# Patient Record
Sex: MALE | Race: WHITE | NOT HISPANIC OR LATINO | URBAN - METROPOLITAN AREA
[De-identification: names, ages, dates, MRNs, and addresses within clinical notes are randomized per-mention and may not be internally consistent; named-entity substitution may affect disease eponyms.]

---

## 2017-05-09 ENCOUNTER — IMPORTED ENCOUNTER (OUTPATIENT)
Dept: URBAN - METROPOLITAN AREA CLINIC 50 | Facility: CLINIC | Age: 77
End: 2017-05-09

## 2017-05-22 ENCOUNTER — IMPORTED ENCOUNTER (OUTPATIENT)
Dept: URBAN - METROPOLITAN AREA CLINIC 50 | Facility: CLINIC | Age: 77
End: 2017-05-22

## 2017-06-09 ENCOUNTER — IMPORTED ENCOUNTER (OUTPATIENT)
Dept: URBAN - METROPOLITAN AREA CLINIC 50 | Facility: CLINIC | Age: 77
End: 2017-06-09

## 2017-11-20 ENCOUNTER — IMPORTED ENCOUNTER (OUTPATIENT)
Dept: URBAN - METROPOLITAN AREA CLINIC 50 | Facility: CLINIC | Age: 77
End: 2017-11-20

## 2017-11-20 NOTE — PATIENT DISCUSSION
"""Continue Combigan right eye at bedtime. "" ""Continue Latanoprost both eyes at bedtime. "" ""Reassured patient that intraocular pressures (IOPs) are at target levels and other ocular findings are stable. Continue present management and/or medication(s).  Follow up as directed. """

## 2017-12-29 ENCOUNTER — IMPORTED ENCOUNTER (OUTPATIENT)
Dept: URBAN - METROPOLITAN AREA CLINIC 50 | Facility: CLINIC | Age: 77
End: 2017-12-29

## 2018-02-08 ENCOUNTER — IMPORTED ENCOUNTER (OUTPATIENT)
Dept: URBAN - METROPOLITAN AREA CLINIC 50 | Facility: CLINIC | Age: 78
End: 2018-02-08

## 2018-05-07 ENCOUNTER — IMPORTED ENCOUNTER (OUTPATIENT)
Dept: URBAN - METROPOLITAN AREA CLINIC 50 | Facility: CLINIC | Age: 78
End: 2018-05-07

## 2018-05-07 NOTE — PATIENT DISCUSSION
"""OCT OU Today (OD>OS)"" ""Follow dry ARMD without treatment. MVI/AREDS/Amsler. Patient to call if vision changes or distortion increases. Good diet/do not smoke. """

## 2018-11-12 ENCOUNTER — IMPORTED ENCOUNTER (OUTPATIENT)
Dept: URBAN - METROPOLITAN AREA CLINIC 50 | Facility: CLINIC | Age: 78
End: 2018-11-12

## 2019-05-20 ENCOUNTER — IMPORTED ENCOUNTER (OUTPATIENT)
Dept: URBAN - METROPOLITAN AREA CLINIC 50 | Facility: CLINIC | Age: 79
End: 2019-05-20

## 2020-05-18 ENCOUNTER — IMPORTED ENCOUNTER (OUTPATIENT)
Dept: URBAN - METROPOLITAN AREA CLINIC 50 | Facility: CLINIC | Age: 80
End: 2020-05-18

## 2020-05-18 NOTE — PATIENT DISCUSSION
"""Continue Combigan right eye twice a day . Continue Latanoprost both eyes at bedtime . "" ""Continue Azopt right eye twice a day . """

## 2020-11-02 ENCOUNTER — IMPORTED ENCOUNTER (OUTPATIENT)
Dept: URBAN - METROPOLITAN AREA CLINIC 50 | Facility: CLINIC | Age: 80
End: 2020-11-02

## 2020-11-06 ENCOUNTER — IMPORTED ENCOUNTER (OUTPATIENT)
Dept: URBAN - METROPOLITAN AREA CLINIC 50 | Facility: CLINIC | Age: 80
End: 2020-11-06

## 2020-11-10 ENCOUNTER — IMPORTED ENCOUNTER (OUTPATIENT)
Dept: URBAN - METROPOLITAN AREA CLINIC 50 | Facility: CLINIC | Age: 80
End: 2020-11-10

## 2020-11-24 ENCOUNTER — IMPORTED ENCOUNTER (OUTPATIENT)
Dept: URBAN - METROPOLITAN AREA CLINIC 50 | Facility: CLINIC | Age: 80
End: 2020-11-24

## 2020-11-24 NOTE — PATIENT DISCUSSION
"""Resolved. Will start taper at this time.  Will decrease to twice a day for one week and once a day ""

## 2021-04-18 ASSESSMENT — PACHYMETRY
OD_CT_UM: 622
OS_CT_UM: 617
OD_CT_UM: 622
OS_CT_UM: 617
OS_CT_UM: 617
OD_CT_UM: 622
OS_CT_UM: 617
OD_CT_UM: 622
OS_CT_UM: 617
OD_CT_UM: 622
OS_CT_UM: 617
OS_CT_UM: 617
OD_CT_UM: 622
OS_CT_UM: 617
OD_CT_UM: 622
OS_CT_UM: 617
OD_CT_UM: 622
OS_CT_UM: 617
OS_CT_UM: 617
OD_CT_UM: 622
OS_CT_UM: 617
OS_CT_UM: 617

## 2021-04-18 ASSESSMENT — TONOMETRY
OD_IOP_MMHG: 19
OD_IOP_MMHG: 12
OS_IOP_MMHG: 14
OS_IOP_MMHG: 14
OD_IOP_MMHG: 18
OD_IOP_MMHG: 29
OD_IOP_MMHG: 18
OS_IOP_MMHG: 22
OS_IOP_MMHG: 17
OD_IOP_MMHG: 22
OS_IOP_MMHG: 15
OS_IOP_MMHG: 16
OD_IOP_MMHG: 26
OD_IOP_MMHG: 22
OD_IOP_MMHG: 22
OS_IOP_MMHG: 21
OD_IOP_MMHG: 20
OD_IOP_MMHG: 19
OS_IOP_MMHG: 21
OS_IOP_MMHG: 16
OS_IOP_MMHG: 16
OS_IOP_MMHG: 18
OS_IOP_MMHG: 18
OD_IOP_MMHG: 16
OS_IOP_MMHG: 17
OD_IOP_MMHG: 30
OS_IOP_MMHG: 18
OD_IOP_MMHG: 15
OS_IOP_MMHG: 11
OD_IOP_MMHG: 18
OD_IOP_MMHG: 16
OS_IOP_MMHG: 12
OS_IOP_MMHG: 18
OS_IOP_MMHG: 20
OD_IOP_MMHG: 17
OD_IOP_MMHG: 15
OS_IOP_MMHG: 14
OD_IOP_MMHG: 25
OD_IOP_MMHG: 21
OS_IOP_MMHG: 20

## 2021-04-18 ASSESSMENT — VISUAL ACUITY
OS_BAT: 20/25-
OS_CC: J1+
OS_CC: J1+@ 16 IN
OS_OTHER: 20/40. 20/40.
OS_CC: 20/20
OS_OTHER: >20/400.
OD_PH: 20/70
OD_CC: 20/400 ECC
OD_PH: 20/40
OD_CC: J1+
OS_OTHER: 20/60. 20/80.
OS_BAT: 20/80
OS_OTHER: 20/80. 20/400.
OS_CC: 20/20-1
OD_OTHER: 20/100. >20/400.
OD_OTHER: 20/80. >20/400.
OS_CC: 20/20-1
OS_OTHER: 20/25-. 20/40.
OD_CC: 20/50
OD_BAT: >20/400
OS_BAT: 20/40
OD_OTHER: >20/400.
OD_BAT: 20/100
OS_CC: 20/25-
OD_OTHER: 20/100. >20/400.
OD_CC: 20/400 ECC
OS_CC: J1+
OD_BAT: 20/80
OS_OTHER: 20/40. 20/40.
OD_BAT: 20/100
OS_CC: 20/20
OS_CC: 20/20-1
OS_CC: 20/20-1
OD_CC: 20/50+2
OS_CC: 20/20
OS_BAT: 20/40
OD_CC: 20/400
OD_CC: J1+
OS_BAT: >20/400
OS_BAT: 20/60
OD_CC: J1+
OD_CC: 20/400
OD_CC: 20/50
OS_CC: 20/25
OD_CC: CF@4'
OD_CC: J1+@ 16 IN
OS_CC: J1+
OD_CC: 20/30
OD_PH: 20/80
OD_CC: 20/30
OS_CC: 20/20-2
OS_CC: 20/20

## 2021-06-11 ENCOUNTER — PREPPED CHART (OUTPATIENT)
Dept: URBAN - METROPOLITAN AREA CLINIC 52 | Facility: CLINIC | Age: 81
End: 2021-06-11

## 2022-12-05 NOTE — PATIENT DISCUSSION
"""OCT OU today. Follow dry ARMD without treatment. MVI/AREDS/[place].  Patient to call if vision "" Name band;

## 2023-11-21 NOTE — PATIENT DISCUSSION
"""Discussed with patient that it does not appear to be viral as viral would cure itself in a couple of ""
"""Patient's IOP is stable
Lisy Stern(Attending)